# Patient Record
(demographics unavailable — no encounter records)

---

## 2024-10-07 NOTE — HISTORY OF PRESENT ILLNESS
[FreeTextEntry1] : - Dr Eliseo ZACARIAS\par  Derm- Dr Skye Dsouza\par  PCP- Dr Gwen Aguilar\par  GI- Dr Bart Perales

## 2024-10-07 NOTE — ASSESSMENT
[FreeTextEntry1] : ASHD- Turned out his atypical CP was angina. + LAD ischemia on Nuc which led to a cardiac cath, SINGH to p LAD Sept 2020. Pt is now symptom free. All meds reviewed. Will be on DAPT(dual anti-platelet therspy) for 1 year, he knows to avoid NSAIDs. Labs done with Dr Aguilar 2021, Had gross hematuria August 2021, did not have cysto or US?? Did see Urology. Only on ASA now.    HLD- LDL was 114, total was 210 pre-statin. Goal is an LDL < 70.  MVP- Mild, SBE Prophylaxis utilized and renewed. Echo unchanged 6/2020, 2024.  Ao root of 38- updated echo 6/30/20. Root 39 mm, f/u in 6 mos.  35 mm 2024.  Fatigue- will do a routine stress test.

## 2024-10-07 NOTE — PHYSICAL EXAM
[General Appearance - Well Developed] : well developed [Normal Appearance] : normal appearance [Well Groomed] : well groomed [General Appearance - Well Nourished] : well nourished [No Deformities] : no deformities [General Appearance - In No Acute Distress] : no acute distress [Normal Conjunctiva] : the conjunctiva exhibited no abnormalities [Eyelids - No Xanthelasma] : the eyelids demonstrated no xanthelasmas [Respiration, Rhythm And Depth] : normal respiratory rhythm and effort [Exaggerated Use Of Accessory Muscles For Inspiration] : no accessory muscle use [Auscultation Breath Sounds / Voice Sounds] : lungs were clear to auscultation bilaterally [Heart Rate And Rhythm] : heart rate and rhythm were normal [Heart Sounds] : normal S1 and S2 [Abdomen Soft] : soft [Abdomen Tenderness] : non-tender [Abdomen Mass (___ Cm)] : no abdominal mass palpated [Abnormal Walk] : normal gait [Gait - Sufficient For Exercise Testing] : the gait was sufficient for exercise testing [Nail Clubbing] : no clubbing of the fingernails [Cyanosis, Localized] : no localized cyanosis [Petechial Hemorrhages (___cm)] : no petechial hemorrhages [Skin Color & Pigmentation] : normal skin color and pigmentation [] : no rash [No Venous Stasis] : no venous stasis [Skin Lesions] : no skin lesions [No Skin Ulcers] : no skin ulcer [No Xanthoma] : no  xanthoma was observed [Oriented To Time, Place, And Person] : oriented to person, place, and time [Affect] : the affect was normal [Mood] : the mood was normal [FreeTextEntry1] : anxiety